# Patient Record
Sex: MALE | Race: WHITE | NOT HISPANIC OR LATINO | Employment: OTHER | URBAN - NONMETROPOLITAN AREA
[De-identification: names, ages, dates, MRNs, and addresses within clinical notes are randomized per-mention and may not be internally consistent; named-entity substitution may affect disease eponyms.]

---

## 2019-10-08 ENCOUNTER — OFFICE VISIT (OUTPATIENT)
Dept: CARDIOLOGY | Facility: CLINIC | Age: 69
End: 2019-10-08

## 2019-10-08 VITALS
HEART RATE: 62 BPM | SYSTOLIC BLOOD PRESSURE: 140 MMHG | DIASTOLIC BLOOD PRESSURE: 80 MMHG | BODY MASS INDEX: 31.28 KG/M2 | WEIGHT: 236 LBS | OXYGEN SATURATION: 98 % | HEIGHT: 73 IN

## 2019-10-08 DIAGNOSIS — Z01.818 PREOPERATIVE CLEARANCE: Primary | ICD-10-CM

## 2019-10-08 PROBLEM — M25.552 PAIN IN JOINT OF LEFT HIP: Chronic | Status: ACTIVE | Noted: 2019-10-08

## 2019-10-08 PROCEDURE — 99203 OFFICE O/P NEW LOW 30 MIN: CPT | Performed by: INTERNAL MEDICINE

## 2019-10-08 PROCEDURE — 93000 ELECTROCARDIOGRAM COMPLETE: CPT | Performed by: INTERNAL MEDICINE

## 2019-10-08 NOTE — PROGRESS NOTES
"..      Subjective:     Encounter Date:10/08/2019      Patient ID: Randall Vences is a 69 y.o. male.    Chief Complaint: Preoperative clearance  History of Present Illness patient is a pleasant 69-year-old gentleman.  He has a history of a small abdominal aortic aneurysm.  His primary complaint is that he has a left hip pain.  This is almost made his life intolerable.  He is actually having to stand up straight the entire visit.  He denies any chest pain angina he denies any shortness of air.He has no palpitations.  He denies any back pain.  He had a ultrasound in March which showed no significant increase in aneurysmal size.  He has no cardiac complaints at the present time.          /80 (BP Location: Right arm, Patient Position: Sitting, Cuff Size: Adult)   Pulse 62   Ht 185.4 cm (73\")   Wt 107 kg (236 lb)   SpO2 98%   BMI 31.14 kg/m²     Past Medical History:   Diagnosis Date   • AAA (abdominal aortic aneurysm) (CMS/[facility])    • Hyperlipidemia      Past Surgical History:   Procedure Laterality Date   • APPENDECTOMY     • CATARACT EXTRACTION     • REPLACEMENT TOTAL KNEE Bilateral      Social History     Socioeconomic History   • Marital status:      Spouse name: Not on file   • Number of children: Not on file   • Years of education: Not on file   • Highest education level: Not on file   Tobacco Use   • Smoking status: Never Smoker   • Smokeless tobacco: Never Used   Substance and Sexual Activity   • Alcohol use: No     Frequency: Never   • Drug use: No   • Sexual activity: Defer     Family History   Problem Relation Age of Onset   • Diabetes Mother    • Heart disease Father    • Heart attack Father        Current Outpatient Medications:   •  aspirin 81 MG chewable tablet, Chew 81 mg Daily., Disp: , Rfl:   •  cetirizine (zyrTEC) 10 MG tablet, Take 10 mg by mouth Daily., Disp: , Rfl: 3  •  coenzyme Q10 100 MG capsule, Take 100 mg by mouth Daily., Disp: , Rfl:   •  lansoprazole (PREVACID) 15 MG " capsule, Take 15 mg by mouth Daily., Disp: , Rfl:   •  lovastatin (MEVACOR) 20 MG tablet, Take 20 mg by mouth Daily., Disp: , Rfl: 4  •  naproxen (NAPROSYN) 500 MG tablet, TAKE 1 TABLET BY MOUTH TWICE DAILY WITH FOOD. AVOID USING OTHER NSAIDS, Disp: , Rfl: 2  No Known Allergies      Review of Systems:   Constitution: No chills, no rigors, no unexplained weight loss or weight gain    Eyes:  No diplopia, no blurred vision, no loss of vision, conjunctiva is pink and sclera is anicteric    ENT:  No tinnitus, no otorrhea, no epistaxis, no sore throat   Respiratory: No cough, no hemoptysis    Cardiovascular:  As mentioned in HPI    Gastrointestinal: No nausea, no vomiting, no hematemesis, no diarrhea or constipation, no melena    Genitourinary: No frequency of dysuria no hematuria    Integument: positive for  No pruritis and  no skin rash    Hematologic / Lymphatic: No excessive bleeding, easy bruising, fatigue, lymphadenopathy and petechiae    Musculoskeletal: Positive for joint pain, joint stiffness, joint swelling, muscle pain, muscle weakness and neck pain    Neurological: No dizziness, headaches, light headedness, seizures and vertigo or stroke    Behavioral/Psych: No depression, or bipolar disorder    Endocrine: no h/o diabetes, hyperlipidemia or thyroid problems           Objective:     Physical Exam   Constitutional: He is oriented to person, place, and time. He appears well-developed and well-nourished.   HENT:   Head: Normocephalic and atraumatic.   Eyes: Pupils are equal, round, and reactive to light.   Neck: Normal range of motion.   Cardiovascular: Normal rate and regular rhythm.   Pulmonary/Chest: Effort normal.   Abdominal: Soft.   Musculoskeletal: Normal range of motion.   Neurological: He is alert and oriented to person, place, and time.   Skin: Skin is warm.   Psychiatric: He has a normal mood and affect. His behavior is normal. Judgment and thought content normal.       Procedures  This ECG shows normal  sinus rhythm with a rare PVC.  Lab Review:       Assessment:       Preoperative clearance       Plan:       Patient is found to be at low operative risk as he has no cardiac symptoms at the present time.  His abdominal aortic aneurysm is not of any significance.  He is to call if there is any change in his symptoms before the surgery.

## 2019-11-18 DIAGNOSIS — I71.40 ABDOMINAL AORTIC ANEURYSM (AAA) WITHOUT RUPTURE (HCC): Primary | ICD-10-CM

## 2019-11-19 ENCOUNTER — OFFICE VISIT (OUTPATIENT)
Dept: CARDIAC SURGERY | Facility: CLINIC | Age: 69
End: 2019-11-19

## 2019-11-19 VITALS
OXYGEN SATURATION: 96 % | BODY MASS INDEX: 29.79 KG/M2 | DIASTOLIC BLOOD PRESSURE: 74 MMHG | WEIGHT: 224.8 LBS | SYSTOLIC BLOOD PRESSURE: 136 MMHG | HEART RATE: 71 BPM | HEIGHT: 73 IN

## 2019-11-19 DIAGNOSIS — E78.2 MIXED HYPERLIPIDEMIA: ICD-10-CM

## 2019-11-19 DIAGNOSIS — I71.40 AAA (ABDOMINAL AORTIC ANEURYSM) WITHOUT RUPTURE (HCC): Primary | ICD-10-CM

## 2019-11-19 DIAGNOSIS — I70.219 ATHEROSCLEROTIC PVD WITH INTERMITTENT CLAUDICATION (HCC): ICD-10-CM

## 2019-11-19 DIAGNOSIS — I73.9 CLAUDICATION (HCC): ICD-10-CM

## 2019-11-19 PROCEDURE — 99214 OFFICE O/P EST MOD 30 MIN: CPT | Performed by: NURSE PRACTITIONER

## 2019-11-19 RX ORDER — OXYCODONE AND ACETAMINOPHEN 10; 325 MG/1; MG/1
1 TABLET ORAL EVERY 6 HOURS PRN
COMMUNITY

## 2019-11-19 RX ORDER — HYDROCODONE BITARTRATE AND ACETAMINOPHEN 5; 325 MG/1; MG/1
1 TABLET ORAL EVERY 6 HOURS PRN
COMMUNITY
End: 2022-06-09

## 2019-11-19 RX ORDER — ZOLPIDEM TARTRATE 10 MG/1
10 TABLET ORAL NIGHTLY PRN
COMMUNITY
End: 2022-06-09

## 2019-11-19 NOTE — PATIENT INSTRUCTIONS
Abdominal Aortic Aneurysm also referred to as AAA is a vascular disease of the abdominal aorta caused by genetic predisposition, trauma, smoking, hypertension, as well as other risks factors.  Periodic surveillance, risk factor reduction, and control of hypertension is the most common method of treatment.  However, when aneurysm size exceeds 5.0cm the potential for rupture is substantially higher and may indicate the need for surgery.  For aneurysms less than 5cm continue to follow up with vascular for surveilance unless you begin to have signs of impending rupture which includes tearing sensation of the abdomen or flank/back pain unrelieved by position change in which case report to your closest emergency department for further evaluation.

## 2019-11-20 PROBLEM — E78.2 MIXED HYPERLIPIDEMIA: Status: ACTIVE | Noted: 2019-11-20

## 2019-11-20 PROBLEM — I73.9 CLAUDICATION (HCC): Status: ACTIVE | Noted: 2019-11-20

## 2019-11-20 NOTE — PROGRESS NOTES
CVTS Office Progress Note     11/20/2019    Randall Vences  1950    Chief Complaint:    Chief Complaint   Patient presents with   • Post-op Aneurysm Repair     chief complaint       HPI:      PCP:  Edd Turner MD  Cardiology:  Dr. Correa    69 y.o. male with HTN(stable, increased risk stroke, rupture), Hyperlipidemia(stable, increased risk cardiovascular events) and Chronic Kidney Disease(stable, increased risk renal failure) AAA (new), insominia.  former smoker and quit 2007.  Incidental discovery of AAA on MRI during orthopedic evaluation.  He was subsequently referred to [facility] for surveillance of AAA.  No splinter hemmorhages, vague complaints of claudication with distances >50 meters.  No FH of AAA.  BP fairly well controlled.  No other associated signs, symptoms or modifying factors.    11/19/2019 U/S Aorta: 40mm infrarenal, RCIA 12mm, LCIA, 12mm    The following portions of the patient's history were reviewed and updated as appropriate: allergies, current medications, past family history, past medical history, past social history, past surgical history and problem list.  Recent images independently reviewed.  Available laboratory values reviewed.    PMH:  Past Medical History:   Diagnosis Date   • AAA (abdominal aortic aneurysm) (CMS/HCC)    • Hyperlipidemia      Past Surgical History:   Procedure Laterality Date   • APPENDECTOMY     • CATARACT EXTRACTION     • REPLACEMENT TOTAL KNEE Bilateral      Family History   Problem Relation Age of Onset   • Diabetes Mother    • Heart disease Father    • Heart attack Father      Social History     Tobacco Use   • Smoking status: Never Smoker   • Smokeless tobacco: Never Used   Substance Use Topics   • Alcohol use: No     Frequency: Never   • Drug use: No       ALLERGIES:  No Known Allergies      MEDICATIONS:    Current Outpatient Medications:   •  aspirin 81 MG chewable tablet, Chew 81 mg Daily., Disp: , Rfl:   •  cetirizine (zyrTEC) 10 MG tablet, Take 10 mg  by mouth Daily., Disp: , Rfl: 3  •  coenzyme Q10 100 MG capsule, Take 100 mg by mouth Daily., Disp: , Rfl:   •  HYDROcodone-acetaminophen (NORCO) 5-325 MG per tablet, Take 1 tablet by mouth Every 6 (Six) Hours As Needed., Disp: , Rfl:   •  lansoprazole (PREVACID) 15 MG capsule, Take 15 mg by mouth Daily., Disp: , Rfl:   •  lovastatin (MEVACOR) 20 MG tablet, Take 20 mg by mouth Daily., Disp: , Rfl: 4  •  naproxen (NAPROSYN) 500 MG tablet, TAKE 1 TABLET BY MOUTH TWICE DAILY WITH FOOD. AVOID USING OTHER NSAIDS, Disp: , Rfl: 2  •  oxyCODONE-acetaminophen (PERCOCET)  MG per tablet, Take 1 tablet by mouth Every 6 (Six) Hours As Needed for Moderate Pain ., Disp: , Rfl:   •  zolpidem (AMBIEN) 10 MG tablet, Take 10 mg by mouth At Night As Needed for Sleep., Disp: , Rfl:   •  metoprolol tartrate (LOPRESSOR) 25 MG tablet, Take 0.5 tablets by mouth 2 (Two) Times a Day., Disp: 30 tablet, Rfl: 5      Review of Systems   Constitution: Negative for chills, decreased appetite, fever, weakness and weight loss.   HENT: Negative for congestion, nosebleeds and sore throat.    Eyes: Negative for blurred vision, visual disturbance and visual halos.   Cardiovascular: Positive for dyspnea on exertion. Negative for chest pain and leg swelling.   Respiratory: Negative for cough, shortness of breath, sputum production and wheezing.    Endocrine: Negative for cold intolerance and polyuria.   Hematologic/Lymphatic: Negative for bleeding problem. Bruises/bleeds easily.        Does not report S/S of adverse bleeding event including nose bleeds, hematuria, melena, gingival bleeding, or hematemesis.   Skin: Negative for flushing, nail changes and unusual hair distribution.   Musculoskeletal: Positive for arthritis, back pain and joint pain.   Gastrointestinal: Negative for bloating, abdominal pain, hematemesis, melena, nausea and vomiting.   Genitourinary: Negative for flank pain and hematuria.   Neurological: Negative for brief paralysis,  "difficulty with concentration, focal weakness, light-headedness, loss of balance, numbness and paresthesias.        No amaurosis fugax, TIA, or CVA.     Psychiatric/Behavioral: Negative for altered mental status, depression, substance abuse and suicidal ideas.   Allergic/Immunologic: Negative for hives and persistent infections.         Vitals:    11/19/19 0853   BP: 136/74   BP Location: Left arm   Patient Position: Sitting   Cuff Size: Adult   Pulse: 71   SpO2: 96%   Weight: 102 kg (224 lb 12.8 oz)   Height: 185.4 cm (73\")     Physical Exam   Constitutional: He is oriented to person, place, and time. He appears well-developed and well-nourished.   HENT:   Head: Normocephalic and atraumatic.   Mouth/Throat: Oropharynx is clear and moist.   Eyes: EOM are normal. Pupils are equal, round, and reactive to light.   Neck: Normal range of motion. Neck supple.   Cardiovascular: Normal rate and intact distal pulses.   Pulmonary/Chest: Effort normal and breath sounds normal. No respiratory distress.   Abdominal: Soft. Bowel sounds are normal. He exhibits no distension.   Abdomen non-pulsatile   Musculoskeletal: Normal range of motion. He exhibits no edema or tenderness.   Neurological: He is alert and oriented to person, place, and time. No cranial nerve deficit.   Skin: Skin is warm and dry. Capillary refill takes 2 to 3 seconds.   Discoloration nails, thick    There is no evidence of skin breakdown of the bilateral lower extremities.  BLE pink, no evidence of ischemia.  Feet are absent of dependent rubor.   Psychiatric: He has a normal mood and affect. His behavior is normal. Judgment normal.   Nursing note and vitals reviewed.      Assessment & Plan     Independent Review of Studies  11/19/2019 U/S Aorta: 40mm infrarenal, RCIA 12mm, LCIA, 12mm    1. AAA (abdominal aortic aneurysm) without rupture (CMS/HCC)  Aneurysmal dilation of 40mm, recommend routine follow up with repeat US in 12 months.  Contact heart and vascular " center splinter hemorrhages, report to emergency department for tearing back/abdominal pain.  Encourage smoking cessation, avoid strenuous lifting, systolic blood pressure goal of less than 120.  Endovascular exclusion to be considered when aneurysm reaches 50 mm in size or growth rate of greater than 10% in 1 year is observed.      Add low dose BB  - Duplex Aorta IVC Iliac Graft Limited CAR; Future    2. Atherosclerotic PVD with intermittent claudication (CMS/HCC)  Obtain APPLE at next visit to rule out PVD as potential cause for leg pain    - Doppler Ankle Brachial Index Single Level CAR; Future    3. Claudication (CMS/HCC)   As above  - Doppler Ankle Brachial Index Single Level CAR; Future    4. Mixed hyperlipidemia  Lipid-lowering therapy has been proven beneficial in patients with vascular disease. Current guidelines recommend statin treatment for all patients with PAD and carotid stenosis. Statins are beneficial in preventing cardiovascular events, increasing functional capacity and lower the risk of adverse limb loss in PAD. Statins decrease the progression of plaque formation and may improve peripheral vessel lining, and aid in reversing atherosclerosis.      Detailed discussion regarding risks, benefits, and treatment plan. Images independently reviewed. Patient understands, agrees, and wishes to proceed with plan.       This document has been electronically signed by Alvaro Madrigal AGACNP-BC @  On November 20, 2019 4:17 PM      EMR Dragon/Transcription disclaimer:   Much of this encounter note is an electronic transcription/translation of spoken language to printed text. The electronic translation of spoken language may permit erroneous, or at times, nonsensical words or phrases to be inadvertently transcribed; Although I have reviewed the note for such errors, some may still exist.

## 2020-05-18 NOTE — TELEPHONE ENCOUNTER
Barbie Lopez [facility]   Phone Number: [phone]             Pt is needing a refill for metoprolol tartrate 25 MG sent to [facility] on [address]  In [place].

## 2020-05-28 ENCOUNTER — OFFICE VISIT (OUTPATIENT)
Dept: CARDIAC SURGERY | Facility: CLINIC | Age: 70
End: 2020-05-28

## 2020-05-28 VITALS
DIASTOLIC BLOOD PRESSURE: 90 MMHG | BODY MASS INDEX: 33.53 KG/M2 | OXYGEN SATURATION: 97 % | WEIGHT: 253 LBS | SYSTOLIC BLOOD PRESSURE: 146 MMHG | HEIGHT: 73 IN | TEMPERATURE: 97.3 F | HEART RATE: 61 BPM

## 2020-05-28 DIAGNOSIS — E78.2 MIXED HYPERLIPIDEMIA: ICD-10-CM

## 2020-05-28 DIAGNOSIS — I71.40 AAA (ABDOMINAL AORTIC ANEURYSM) WITHOUT RUPTURE (HCC): Primary | ICD-10-CM

## 2020-05-28 DIAGNOSIS — I10 BENIGN ESSENTIAL HTN: ICD-10-CM

## 2020-05-28 DIAGNOSIS — I73.9 CLAUDICATION (HCC): ICD-10-CM

## 2020-05-28 PROCEDURE — 99214 OFFICE O/P EST MOD 30 MIN: CPT | Performed by: NURSE PRACTITIONER

## 2020-05-28 NOTE — PATIENT INSTRUCTIONS
The results of your vascular studies of your right leg shows mild disease with good  circulation, in the left leg shows milddisease with good circulation.      If signs and symptoms of ischemia should occur including but not limited to pale/blue discoloration of limb, increasing pain with ambulation or at rest, or a non-healing wound. Patient is to notify Heart and Vascular center for immediate evaluation.    Abdominal Aortic Aneurysm also referred to as AAA is a vascular disease of the abdominal aorta caused by genetic predisposition, trauma, smoking, hypertension, as well as other risks factors.  Periodic surveillance, risk factor reduction, and control of hypertension is the most common method of treatment.  However, when aneurysm size exceeds 5.0cm the potential for rupture is substantially higher and may indicate the need for surgery.  For aneurysms less than 5cm continue to follow up with vascular for surveilance unless you begin to have signs of impending rupture which includes tearing sensation of the abdomen or flank/back pain unrelieved by position change in which case report to your closest emergency department for further evaluation.    Current measurement 38mm.  Repeat ultrasound in one year.

## 2020-05-28 NOTE — PROGRESS NOTES
CVTS Office Progress Note     5/28/2020    Randall Vences  1950    Chief Complaint:    Chief Complaint   Patient presents with   • Follow-up     6 mo AAA       HPI:      PCP:  Edd Turner MD  Cardiology:  Dr. Correa    70 y.o. male with HTN(stable, increased risk stroke, rupture), Hyperlipidemia(stable, increased risk cardiovascular events) and Chronic Kidney Disease(stable, increased risk renal failure) AAA (new), insominia.  former smoker and quit 2007.  Incidental discovery of AAA on MRI during orthopedic evaluation.  He was subsequently referred to [facility] for surveillance of AAA.  No splinter hemmorhages, vague complaints of claudication with distances >50 meters.  No FH of AAA.  BP fairly well controlled.  No other associated signs, symptoms or modifying factors.    11/19/2019 U/S Aorta: 40mm infrarenal, RCIA 12mm, LCIA, 12mm  5/28/2020 AAA US: Infrarenal aorta 37mm, RCIA 11mm, LCIA 12mm  5/28/2020 APPLE: Right 1.12 triphasic.  Left 1.14 triphasic.     The following portions of the patient's history were reviewed and updated as appropriate: allergies, current medications, past family history, past medical history, past social history, past surgical history and problem list.  Recent images independently reviewed.  Available laboratory values reviewed.    PMH:  Past Medical History:   Diagnosis Date   • AAA (abdominal aortic aneurysm) (CMS/HCC)    • Hyperlipidemia      Past Surgical History:   Procedure Laterality Date   • APPENDECTOMY     • CATARACT EXTRACTION     • REPLACEMENT TOTAL KNEE Bilateral      Family History   Problem Relation Age of Onset   • Diabetes Mother    • Heart disease Father    • Heart attack Father      Social History     Tobacco Use   • Smoking status: Never Smoker   • Smokeless tobacco: Never Used   Substance Use Topics   • Alcohol use: No     Frequency: Never   • Drug use: No       ALLERGIES:  No Known Allergies      MEDICATIONS:    Current Outpatient Medications:   •  aspirin 81  MG chewable tablet, Chew 81 mg Daily., Disp: , Rfl:   •  cetirizine (zyrTEC) 10 MG tablet, Take 10 mg by mouth Daily., Disp: , Rfl: 3  •  coenzyme Q10 100 MG capsule, Take 100 mg by mouth Daily., Disp: , Rfl:   •  HYDROcodone-acetaminophen (NORCO) 5-325 MG per tablet, Take 1 tablet by mouth Every 6 (Six) Hours As Needed., Disp: , Rfl:   •  lansoprazole (PREVACID) 15 MG capsule, Take 15 mg by mouth Daily., Disp: , Rfl:   •  lovastatin (MEVACOR) 20 MG tablet, Take 20 mg by mouth Daily., Disp: , Rfl: 4  •  metoprolol tartrate (LOPRESSOR) 25 MG tablet, Take 1 tablet by mouth 2 (Two) Times a Day., Disp: 60 tablet, Rfl: 5  •  naproxen (NAPROSYN) 500 MG tablet, TAKE 1 TABLET BY MOUTH TWICE DAILY WITH FOOD. AVOID USING OTHER NSAIDS, Disp: , Rfl: 2  •  oxyCODONE-acetaminophen (PERCOCET)  MG per tablet, Take 1 tablet by mouth Every 6 (Six) Hours As Needed for Moderate Pain ., Disp: , Rfl:   •  zolpidem (AMBIEN) 10 MG tablet, Take 10 mg by mouth At Night As Needed for Sleep., Disp: , Rfl:       Review of Systems   Constitution: Negative for chills, decreased appetite, fever and weight loss.   HENT: Negative for congestion, nosebleeds and sore throat.    Eyes: Negative for blurred vision, visual disturbance and visual halos.   Cardiovascular: Positive for dyspnea on exertion. Negative for chest pain and leg swelling.   Respiratory: Negative for cough, shortness of breath, sputum production and wheezing.    Endocrine: Negative for cold intolerance and polyuria.   Hematologic/Lymphatic: Negative for bleeding problem. Bruises/bleeds easily.        Does not report S/S of adverse bleeding event including nose bleeds, hematuria, melena, gingival bleeding, or hematemesis.   Skin: Negative for flushing, nail changes and unusual hair distribution.   Musculoskeletal: Positive for arthritis, back pain and joint pain.   Gastrointestinal: Negative for bloating, abdominal pain, hematemesis, melena, nausea and vomiting.   Genitourinary:  "Negative for flank pain and hematuria.   Neurological: Negative for brief paralysis, difficulty with concentration, focal weakness, light-headedness, loss of balance, numbness, paresthesias and weakness.        No amaurosis fugax, TIA, or CVA.     Psychiatric/Behavioral: Negative for altered mental status, depression, substance abuse and suicidal ideas.   Allergic/Immunologic: Negative for hives and persistent infections.         Vitals:    05/28/20 0904   BP: 146/90   BP Location: Left arm   Patient Position: Sitting   Cuff Size: Adult   Pulse: 61   Temp: 97.3 °F (36.3 °C)   TempSrc: Temporal   SpO2: 97%   Weight: 115 kg (253 lb)   Height: 185.4 cm (73\")     Physical Exam   Constitutional: He is oriented to person, place, and time. He appears well-developed and well-nourished.   HENT:   Head: Normocephalic and atraumatic.   Mouth/Throat: Oropharynx is clear and moist.   Eyes: Pupils are equal, round, and reactive to light. EOM are normal.   Neck: Normal range of motion. Neck supple.   Cardiovascular: Normal rate and intact distal pulses.   Pulmonary/Chest: Effort normal and breath sounds normal. No respiratory distress.   Abdominal: Soft. Bowel sounds are normal. He exhibits no distension.   Abdomen non-pulsatile   Musculoskeletal: Normal range of motion. He exhibits no edema or tenderness.   Neurological: He is alert and oriented to person, place, and time. No cranial nerve deficit.   Skin: Skin is warm and dry. Capillary refill takes 2 to 3 seconds.   Discoloration nails, thick    There is no evidence of skin breakdown of the bilateral lower extremities.  BLE pink, no evidence of ischemia.  Feet are absent of dependent rubor.   Psychiatric: He has a normal mood and affect. His behavior is normal. Judgment normal.   Nursing note and vitals reviewed.      Assessment & Plan     Independent Review of Studies  5/28/2020 AAA US: Infrarenal aorta 37mm, RCIA 11mm, LCIA 12mm  5/28/2020 APPLE: Right 1.12 triphasic.  Left " 1.14 triphasic.     1. AAA (abdominal aortic aneurysm) without rupture (CMS/HCC)  Aneurysmal dilation of 38mm, recommend routine follow up with repeat US in 12 months.  Contact heart and vascular center splinter hemorrhages, report to emergency department for tearing back/abdominal pain.  Encourage smoking cessation, avoid strenuous lifting, systolic blood pressure goal of less than 120.  Endovascular exclusion to be considered when aneurysm reaches 50 mm in size or growth rate of greater than 10% in 1 year is observed.      - Duplex Aorta IVC Iliac Graft Limited CAR; Future    2. Claudication (CMS/HCC)   Resting perfusion normal.    Repeat APPLE PRN  PVD ruled out as potential cause for current lower extremity symptoms    - Doppler Ankle Brachial Index Single Level CAR; Future    3. Essential HTN  Increase metoprolol to 25mg BID.  Follow up with PCP if BP not at goal.     4. Mixed Hyperlipidemia  Lipid-lowering therapy has been proven beneficial in patients with cardio-vascular disease. Current guidelines recommend statin treatment for all patients with PAD,CAD and carotid stenosis. Statins are beneficial in preventing cardiovascular events, increasing functional capacity and lower the risk of adverse limb loss in PAD. Statins decrease the progression of plaque formation and may improve peripheral vessel lining, and aid in reversing atherosclerosis.        Detailed discussion regarding risks, benefits, and treatment plan. Images independently reviewed. Patient understands, agrees, and wishes to proceed with plan.       This document has been electronically signed by GIDEON JosephCNP-BC Nanda  On May 28, 2020 09:43      EMR Dragon/Transcription disclaimer:   Much of this encounter note is an electronic transcription/translation of spoken language to printed text. The electronic translation of spoken language may permit erroneous, or at times, nonsensical words or phrases to be inadvertently transcribed; Although I have  reviewed the note for such errors, some may still exist.

## 2021-06-08 ENCOUNTER — OFFICE VISIT (OUTPATIENT)
Dept: CARDIAC SURGERY | Facility: CLINIC | Age: 71
End: 2021-06-08

## 2021-06-08 VITALS
DIASTOLIC BLOOD PRESSURE: 72 MMHG | WEIGHT: 253 LBS | HEART RATE: 59 BPM | BODY MASS INDEX: 31.46 KG/M2 | HEIGHT: 75 IN | TEMPERATURE: 97.8 F | SYSTOLIC BLOOD PRESSURE: 130 MMHG | OXYGEN SATURATION: 99 %

## 2021-06-08 DIAGNOSIS — I71.40 AAA (ABDOMINAL AORTIC ANEURYSM) WITHOUT RUPTURE (HCC): Primary | ICD-10-CM

## 2021-06-08 DIAGNOSIS — E78.2 MIXED HYPERLIPIDEMIA: ICD-10-CM

## 2021-06-08 PROCEDURE — 99214 OFFICE O/P EST MOD 30 MIN: CPT | Performed by: NURSE PRACTITIONER

## 2021-06-08 RX ORDER — GABAPENTIN 300 MG/1
300 CAPSULE ORAL 3 TIMES DAILY
COMMUNITY
End: 2022-06-09

## 2021-06-08 NOTE — PATIENT INSTRUCTIONS
Abdominal Aortic Aneurysm also referred to as AAA is a vascular disease of the abdominal aorta caused by genetic predisposition, trauma, smoking, hypertension, as well as other risks factors.  Periodic surveillance, risk factor reduction, and control of hypertension is the most common method of treatment.  However, when aneurysm size exceeds 5.0cm the potential for rupture is substantially higher and may indicate the need for surgery.  For aneurysms less than 5cm continue to follow up with vascular for surveilance unless you begin to have signs of impending rupture which includes tearing sensation of the abdomen or flank/back pain unrelieved by position change in which case report to your closest emergency department for further evaluation.      Aneurysm measures 3.6cm on todays ultrasound.     One year ultrasound aorta.      Request CT imaging from pain management.       Deliver disc to [address] [place] [address], enmanuel Madrigal

## 2021-06-10 NOTE — PROGRESS NOTES
CVTS Office Progress Note     6/10/2021    Randall Vences  1950    Chief Complaint:    Chief Complaint   Patient presents with   • Aortic Aneurysm       HPI:      PCP:  Rach Giang APRN  Cardiology:  Dr. Correa    71 y.o. male with HTN(stable, increased risk stroke, rupture), Hyperlipidemia(stable, increased risk cardiovascular events) and Chronic Kidney Disease(stable, increased risk renal failure) AAA (new), insominia.  former smoker and quit 2007.  Incidental discovery of AAA on MRI during orthopedic evaluation.  He was subsequently referred to [facility] for surveillance of AAA.  No splinter hemmorhages, vague complaints of claudication with distances >50 meters.  No FH of AAA.  BP fairly well controlled.  No other associated signs, symptoms or modifying factors.    11/19/2019 U/S Aorta: 40mm infrarenal, RCIA 12mm, LCIA, 12mm  5/28/2020 AAA US: Infrarenal aorta 37mm, RCIA 11mm, LCIA 12mm  5/28/2020 APPLE: Right 1.12 triphasic.  Left 1.14 triphasic.   6/2021 US Aorta: Infrarenal aorta 36mm, RCIA 12mm, LCIA 11mm    The following portions of the patient's history were reviewed and updated as appropriate: allergies, current medications, past family history, past medical history, past social history, past surgical history and problem list.  Recent images independently reviewed.  Available laboratory values reviewed.    PMH:  Past Medical History:   Diagnosis Date   • AAA (abdominal aortic aneurysm) (CMS/HCC)    • Hyperlipidemia      Past Surgical History:   Procedure Laterality Date   • APPENDECTOMY     • CATARACT EXTRACTION     • REPLACEMENT TOTAL KNEE Bilateral      Family History   Problem Relation Age of Onset   • Diabetes Mother    • Heart disease Father    • Heart attack Father      Social History     Tobacco Use   • Smoking status: Never Smoker   • Smokeless tobacco: Never Used   Substance Use Topics   • Alcohol use: No   • Drug use: No       ALLERGIES:  No Known Allergies      MEDICATIONS:    Current  Outpatient Medications:   •  aspirin 81 MG chewable tablet, Chew 81 mg Daily., Disp: , Rfl:   •  cetirizine (zyrTEC) 10 MG tablet, Take 10 mg by mouth Daily., Disp: , Rfl: 3  •  coenzyme Q10 100 MG capsule, Take 100 mg by mouth Daily., Disp: , Rfl:   •  gabapentin (NEURONTIN) 300 MG capsule, Take 300 mg by mouth 3 (Three) Times a Day., Disp: , Rfl:   •  HYDROcodone-acetaminophen (NORCO) 5-325 MG per tablet, Take 1 tablet by mouth Every 6 (Six) Hours As Needed., Disp: , Rfl:   •  lansoprazole (PREVACID) 15 MG capsule, Take 15 mg by mouth Daily., Disp: , Rfl:   •  lovastatin (MEVACOR) 20 MG tablet, Take 20 mg by mouth Daily., Disp: , Rfl: 4  •  metoprolol tartrate (LOPRESSOR) 25 MG tablet, Take 1 tablet by mouth 2 (Two) Times a Day., Disp: 60 tablet, Rfl: 11  •  naproxen (NAPROSYN) 500 MG tablet, TAKE 1 TABLET BY MOUTH TWICE DAILY WITH FOOD. AVOID USING OTHER NSAIDS, Disp: , Rfl: 2  •  oxyCODONE-acetaminophen (PERCOCET)  MG per tablet, Take 1 tablet by mouth Every 6 (Six) Hours As Needed for Moderate Pain ., Disp: , Rfl:   •  zolpidem (AMBIEN) 10 MG tablet, Take 10 mg by mouth At Night As Needed for Sleep., Disp: , Rfl:       Review of Systems   Constitutional: Negative for chills, decreased appetite, fever and weight loss.   HENT: Negative for congestion, nosebleeds and sore throat.    Eyes: Negative for blurred vision, visual disturbance and visual halos.   Cardiovascular: Positive for dyspnea on exertion. Negative for chest pain and leg swelling.   Respiratory: Negative for cough, shortness of breath, sputum production and wheezing.    Endocrine: Negative for cold intolerance and polyuria.   Hematologic/Lymphatic: Negative for bleeding problem. Bruises/bleeds easily.        Does not report S/S of adverse bleeding event including nose bleeds, hematuria, melena, gingival bleeding, or hematemesis.   Skin: Negative for flushing, nail changes and unusual hair distribution.   Musculoskeletal: Positive for  "arthritis, back pain and joint pain.   Gastrointestinal: Negative for bloating, abdominal pain, hematemesis, melena, nausea and vomiting.   Genitourinary: Negative for flank pain and hematuria.   Neurological: Negative for brief paralysis, difficulty with concentration, focal weakness, light-headedness, loss of balance, numbness, paresthesias and weakness.        No amaurosis fugax, TIA, or CVA.     Psychiatric/Behavioral: Negative for altered mental status, depression, substance abuse and suicidal ideas.   Allergic/Immunologic: Negative for hives and persistent infections.         Vitals:    06/08/21 0854   BP: 130/72   BP Location: Left arm   Patient Position: Sitting   Cuff Size: Adult   Pulse: 59   Temp: 97.8 °F (36.6 °C)   TempSrc: Temporal   SpO2: 99%   Weight: 115 kg (253 lb)   Height: 190.5 cm (75\")     Physical Exam   Constitutional: He is oriented to person, place, and time. He appears well-developed.   HENT:   Head: Normocephalic and atraumatic.   Eyes: Pupils are equal, round, and reactive to light.   Cardiovascular: Normal rate.   Pulmonary/Chest: Effort normal and breath sounds normal. No respiratory distress.   Abdominal: Soft. Bowel sounds are normal. He exhibits no distension.   Abdomen non-pulsatile   Musculoskeletal: Normal range of motion. No tenderness.   Neurological: He is alert and oriented to person, place, and time. No cranial nerve deficit.   Skin: Skin is warm and dry. Capillary refill takes 2 to 3 seconds.   Discoloration nails, thick    There is no evidence of skin breakdown of the bilateral lower extremities.  BLE pink, no evidence of ischemia.  Feet are absent of dependent rubor.   Psychiatric: His behavior is normal. Judgment normal.   Nursing note and vitals reviewed.      Assessment & Plan     Independent Review of Studies  6/2021  Aorta: Infrarenal aorta 36mm, RCIA 12mm, LCIA 11mm    1. AAA (abdominal aortic aneurysm) without rupture (CMS/HCC)  Detailed discussion with Randall " Vangie regarding situation and options.  Aneurysm abdominal aorta.  Surgical intervention not indicated at this time.  Will plan to follow with interval imaging.  Smoking cessation, lipid management, BP control in 120 range advised.  Discussed symptoms of rupture, details of surgical repair including endovascular and open repair.  Risks, benefits discussed.  Understands and wishes to proceed with plan    Return in 1 year with US Aorta    Recent CT of spine where AAA was seen, requested patient to obtain disc for review to compare to U/S measurements    - US aorta ivc iliac graft complete; Future    2. Mixed hyperlipidemia  Lipid-lowering therapy has been proven beneficial in patients with cardio-vascular disease. Current guidelines recommend statin treatment for all patients with PAD,CAD and carotid stenosis. Statins are beneficial in preventing cardiovascular events, increasing functional capacity and lower the risk of adverse limb loss in PAD. Statins decrease the progression of plaque formation and may improve peripheral vessel lining, and aid in reversing atherosclerosis.                Detailed discussion regarding risks, benefits, and treatment plan. Images independently reviewed. Patient understands, agrees, and wishes to proceed with plan.       This document has been electronically signed by GIDEON JosephCNP-BC @  On Flory 10, 2021 09:22 CDT      EMR Dragon/Transcription disclaimer:   Much of this encounter note is an electronic transcription/translation of spoken language to printed text. The electronic translation of spoken language may permit erroneous, or at times, nonsensical words or phrases to be inadvertently transcribed; Although I have reviewed the note for such errors, some may still exist.

## 2022-06-09 ENCOUNTER — OFFICE VISIT (OUTPATIENT)
Dept: CARDIAC SURGERY | Facility: CLINIC | Age: 72
End: 2022-06-09

## 2022-06-09 VITALS
HEART RATE: 55 BPM | HEIGHT: 75 IN | DIASTOLIC BLOOD PRESSURE: 69 MMHG | BODY MASS INDEX: 29.47 KG/M2 | WEIGHT: 237 LBS | OXYGEN SATURATION: 98 % | SYSTOLIC BLOOD PRESSURE: 120 MMHG

## 2022-06-09 DIAGNOSIS — E78.2 MIXED HYPERLIPIDEMIA: ICD-10-CM

## 2022-06-09 DIAGNOSIS — I10 BENIGN ESSENTIAL HTN: ICD-10-CM

## 2022-06-09 DIAGNOSIS — I71.40 AAA (ABDOMINAL AORTIC ANEURYSM) WITHOUT RUPTURE: Primary | ICD-10-CM

## 2022-06-09 PROCEDURE — 99214 OFFICE O/P EST MOD 30 MIN: CPT | Performed by: NURSE PRACTITIONER

## 2022-06-09 RX ORDER — CHLORAL HYDRATE 500 MG
CAPSULE ORAL
COMMUNITY

## 2022-06-09 NOTE — PROGRESS NOTES
CVTS Office Progress Note     6/9/2022    Randall Vences  1950    Chief Complaint:    Chief Complaint   Patient presents with   • Aortic Aneurysm       HPI:      PCP:  Rach Giang APRN  Cardiology:  Dr. Correa    72 y.o. male with HTN(stable, increased risk stroke, rupture), Hyperlipidemia(stable, increased risk cardiovascular events) and Chronic Kidney Disease(stable, increased risk renal failure) AAA (new), insominia.  former smoker and quit 2007.  Incidental discovery of AAA on MRI during orthopedic evaluation.  He was subsequently referred to [facility] for surveillance of AAA.  No splinter hemmorhages, vague complaints of claudication with distances >50 meters.  No FH of AAA.  BP fairly well controlled.  No other associated signs, symptoms or modifying factors.    11/19/2019 U/S Aorta: 40mm infrarenal, RCIA 12mm, LCIA, 12mm  5/28/2020 AAA US: Infrarenal aorta 37mm, RCIA 11mm, LCIA 12mm  5/28/2020 APPLE: Right 1.12 triphasic.  Left 1.14 triphasic.   6/2021 US Aorta: Infrarenal aorta 36mm, RCIA 12mm, LCIA 11mm  6/2022  Aorta: Infrarenal AAA 38mm, R DAVIE 12mm, L DAVIE 11mm    The following portions of the patient's history were reviewed and updated as appropriate: allergies, current medications, past family history, past medical history, past social history, past surgical history and problem list.  Recent images independently reviewed.  Available laboratory values reviewed.    PMH:  Past Medical History:   Diagnosis Date   • AAA (abdominal aortic aneurysm) (HCC)    • Hyperlipidemia      Past Surgical History:   Procedure Laterality Date   • APPENDECTOMY     • CATARACT EXTRACTION     • REPLACEMENT TOTAL KNEE Bilateral      Family History   Problem Relation Age of Onset   • Diabetes Mother    • Heart disease Father    • Heart attack Father      Social History     Tobacco Use   • Smoking status: Never Smoker   • Smokeless tobacco: Never Used   Substance Use Topics   • Alcohol use: No   • Drug use: No        ALLERGIES:  No Known Allergies      MEDICATIONS:    Current Outpatient Medications:   •  aspirin 81 MG chewable tablet, Chew 81 mg Daily., Disp: , Rfl:   •  cetirizine (zyrTEC) 10 MG tablet, Take 10 mg by mouth Daily., Disp: , Rfl: 3  •  lansoprazole (PREVACID) 15 MG capsule, Take 15 mg by mouth Daily., Disp: , Rfl:   •  Omega-3 Fatty Acids (fish oil) 1000 MG capsule capsule, Take  by mouth Daily With Breakfast., Disp: , Rfl:   •  oxyCODONE-acetaminophen (PERCOCET)  MG per tablet, Take 1 tablet by mouth Every 6 (Six) Hours As Needed for Moderate Pain ., Disp: , Rfl:   •  metoprolol tartrate (LOPRESSOR) 25 MG tablet, Take 0.5 tablets by mouth 2 (Two) Times a Day., Disp: 30 tablet, Rfl: 11      Review of Systems   Constitutional: Negative for chills, decreased appetite, fever and weight loss.   HENT: Negative for congestion, nosebleeds and sore throat.    Eyes: Negative for blurred vision, visual disturbance and visual halos.   Cardiovascular: Negative for chest pain, dyspnea on exertion and leg swelling.   Respiratory: Negative for cough, shortness of breath, sputum production and wheezing.    Endocrine: Negative for cold intolerance and polyuria.   Hematologic/Lymphatic: Negative for bleeding problem. Does not bruise/bleed easily.        Does not report S/S of adverse bleeding event including nose bleeds, hematuria, melena, gingival bleeding, or hematemesis.   Skin: Negative for flushing, nail changes and unusual hair distribution.   Musculoskeletal: Positive for arthritis, back pain and joint pain.   Gastrointestinal: Negative for bloating, abdominal pain, hematemesis, melena, nausea and vomiting.   Genitourinary: Negative for flank pain and hematuria.   Neurological: Negative for brief paralysis, difficulty with concentration, focal weakness, light-headedness, loss of balance, numbness, paresthesias and weakness.        No amaurosis fugax, TIA, or CVA.     Psychiatric/Behavioral: Negative for altered  "mental status, depression, substance abuse and suicidal ideas.   Allergic/Immunologic: Negative for hives and persistent infections.         Vitals:    06/09/22 0853   BP: 120/69   BP Location: Left arm   Patient Position: Sitting   Cuff Size: Adult   Pulse: 55   SpO2: 98%   Weight: 108 kg (237 lb)   Height: 190.5 cm (75\")     Physical Exam   Constitutional: He is oriented to person, place, and time. He appears well-developed.   HENT:   Head: Normocephalic and atraumatic.   Eyes: Pupils are equal, round, and reactive to light.   Cardiovascular: Normal rate.   Pulmonary/Chest: Effort normal and breath sounds normal. No respiratory distress.   Abdominal: Soft. Bowel sounds are normal. He exhibits no distension.   Abdomen non-pulsatile   Musculoskeletal: Normal range of motion. No tenderness.   Neurological: He is alert and oriented to person, place, and time. No cranial nerve deficit.   Skin: Skin is warm and dry. Capillary refill takes 2 to 3 seconds.   Discoloration nails, thick    There is no evidence of skin breakdown of the bilateral lower extremities.  BLE pink, no evidence of ischemia.  Feet are absent of dependent rubor.   Psychiatric: His behavior is normal. Judgment normal.   Nursing note and vitals reviewed.      Assessment & Plan     Independent Review of Studies  6/2022  Aorta: Infrarenal AAA 38mm, R DAVIE 12mm, L DAVIE 11mm    1. AAA (abdominal aortic aneurysm) without rupture (CMS/HCC)  Detailed discussion with Randall Vences regarding situation and options.  Aneurysm abdominal aorta.  Surgical intervention not indicated at this time.  Will plan to follow with interval imaging.  Smoking cessation, lipid management, BP control in 120 range advised.  Discussed symptoms of rupture, details of surgical repair including endovascular and open repair.  Risks, benefits discussed.  Understands and wishes to proceed with plan    Return in 1 year with US Aorta    Recent CT of spine where AAA was seen, requested " patient to obtain disc for review to compare to U/S measurements    - US aorta ivc iliac graft complete; Future    2. Mixed hyperlipidemia  Lipid-lowering therapy has been proven beneficial in patients with cardio-vascular disease. Current guidelines recommend statin treatment for all patients with PAD,CAD and carotid stenosis. Statins are beneficial in preventing cardiovascular events, increasing functional capacity and lower the risk of adverse limb loss in PAD. Statins decrease the progression of plaque formation and may improve peripheral vessel lining, and aid in reversing atherosclerosis.    3. HTN  Controlled, CCB            Detailed discussion regarding risks, benefits, and treatment plan. Images independently reviewed. Patient understands, agrees, and wishes to proceed with plan.       This document has been electronically signed by Alvaro Madrigal AGACNP-BC @  On June 9, 2022 10:33 CDMARQUIS

## 2023-05-17 DIAGNOSIS — I71.43 INFRARENAL ABDOMINAL AORTIC ANEURYSM (AAA) WITHOUT RUPTURE: Primary | ICD-10-CM
